# Patient Record
Sex: MALE | Race: WHITE | NOT HISPANIC OR LATINO | Employment: OTHER | URBAN - METROPOLITAN AREA
[De-identification: names, ages, dates, MRNs, and addresses within clinical notes are randomized per-mention and may not be internally consistent; named-entity substitution may affect disease eponyms.]

---

## 2023-06-03 LAB
ANION GAP IN SER/PLAS: 15 MMOL/L (ref 10–20)
CALCIUM (MG/DL) IN SER/PLAS: 9.5 MG/DL (ref 8.6–10.6)
CARBON DIOXIDE, TOTAL (MMOL/L) IN SER/PLAS: 31 MMOL/L (ref 21–32)
CHLORIDE (MMOL/L) IN SER/PLAS: 98 MMOL/L (ref 98–107)
CREATININE (MG/DL) IN SER/PLAS: 1.1 MG/DL (ref 0.5–1.3)
GFR MALE: 74 ML/MIN/1.73M2
GLUCOSE (MG/DL) IN SER/PLAS: 184 MG/DL (ref 74–99)
POTASSIUM (MMOL/L) IN SER/PLAS: 4 MMOL/L (ref 3.5–5.3)
SODIUM (MMOL/L) IN SER/PLAS: 140 MMOL/L (ref 136–145)
UREA NITROGEN (MG/DL) IN SER/PLAS: 15 MG/DL (ref 6–23)

## 2025-05-19 DIAGNOSIS — D3A.8 PRIMARY PANCREATIC NEUROENDOCRINE TUMOR: Primary | ICD-10-CM

## 2025-07-14 ENCOUNTER — HOSPITAL ENCOUNTER (OUTPATIENT)
Dept: RADIOLOGY | Facility: CLINIC | Age: 70
Discharge: HOME | End: 2025-07-14
Payer: MEDICARE

## 2025-07-14 DIAGNOSIS — D3A.8 PRIMARY PANCREATIC NEUROENDOCRINE TUMOR: ICD-10-CM

## 2025-07-14 PROCEDURE — 2550000001 HC RX 255 CONTRASTS: Performed by: PHYSICIAN ASSISTANT

## 2025-07-14 PROCEDURE — 74177 CT ABD & PELVIS W/CONTRAST: CPT

## 2025-07-14 PROCEDURE — 74177 CT ABD & PELVIS W/CONTRAST: CPT | Performed by: RADIOLOGY

## 2025-07-14 RX ADMIN — IOHEXOL 75 ML: 350 INJECTION, SOLUTION INTRAVENOUS at 09:24

## 2025-07-17 ENCOUNTER — TELEPHONE (OUTPATIENT)
Dept: SURGICAL ONCOLOGY | Facility: CLINIC | Age: 70
End: 2025-07-17
Payer: MEDICARE

## 2025-07-17 NOTE — TELEPHONE ENCOUNTER
I had called and spoke to the pt in follow up to his CT scan. I had offered to set up his follow up by telehealth as before with Zee Broussard in our cyst clinic. He asked that I speak to his wife to coordinate. I called and left a VM with her and spoke to her later. She was going to look at her calendar and call the office back to set up a virtual appt with Zee.

## 2025-07-29 ENCOUNTER — TELEMEDICINE (OUTPATIENT)
Dept: SURGICAL ONCOLOGY | Facility: CLINIC | Age: 70
End: 2025-07-29
Payer: MEDICARE

## 2025-07-29 DIAGNOSIS — D3A.8 BENIGN NEUROENDOCRINE NEOPLASM OF PANCREAS: Primary | ICD-10-CM

## 2025-07-29 PROCEDURE — 99213 OFFICE O/P EST LOW 20 MIN: CPT | Performed by: PHYSICIAN ASSISTANT

## 2025-07-29 NOTE — PROGRESS NOTES
Virtual or Telephone Consent    While technically available, the patient was unable or unwilling to consent to connect via audio/video telehealth technology; therefore, I performed this visit using a real-time audio only connection between Gibran Broussard & Zee Broussard PA-C.  Verbal consent was requested and obtained from Gibran Broussard on this date, 07/29/25 for a telehealth visit and the patient's location was confirmed at the time of the visit.    Subjective   Mr. Broussard is a 70-year-old male who is here to discuss surveillance imaging for pancreatic neuroendocrine tumors.    He was hospitalized in 2019 for scrotal cellulitis. As part of that work-up, he had a CT A/P that showed an incidental finding of two hyperenhancing nodules in the pancreatic tail. After discharge, he saw Dr. Adrian Ferreira who felt these were pancreatic neuroendocrine tumors. He recommended surveillance given size less than 2.0 cm.     Surveillance CT A/P from 7/14/25 demonstrates a stable 1.3 enhancing nodule at the tip of the pancreatic tail and a stable 0.8 cm enhancing nodule in the pancreas tail with no MPD dilatation.     He denies a personal history of pancreatitis.    He denies chronic abdominal pain, early satiety, poor appetite, jaundice or unintentional weight loss.     Medical and surgical history: HTN HLD, T2DM, hypoparathyroid, robotic-assisted recurrent left inguinal hernia repair with mesh, nephrolithiasis s/p lithotripsy, BPH s/p prostate biopsy.   Family history: He is adopted.   Social history: Former smoker, quit in 2013. Occasional alcohol use. No illicits.  to [place].     Objective     There were no vitals taken for this visit.     Physical Exam  A physical exam was not conducted as this was a phone or virtual visit. The patient is in no acute distress.     Assessment/Plan   Mr. Broussard is a 70-year-old male who is here to discuss surveillance imaging for pancreatic neuroendocrine tumors.    PLAN: He has two  neuroendocrine tumors in the tail of the pancreas, measuring 1.3 cm and 0.8 cm. These have been stable under surveillance since 2019. We discussed the low risk of metastasis for pNETs less than 2.0 cm and will continue surveillance with CT every other year. I will call him when he is next due.       Zee Broussard PA-C